# Patient Record
Sex: MALE | Race: OTHER | Employment: FULL TIME | URBAN - METROPOLITAN AREA
[De-identification: names, ages, dates, MRNs, and addresses within clinical notes are randomized per-mention and may not be internally consistent; named-entity substitution may affect disease eponyms.]

---

## 2017-09-28 ENCOUNTER — OFFICE VISIT (OUTPATIENT)
Dept: OTOLARYNGOLOGY | Facility: CLINIC | Age: 47
End: 2017-09-28

## 2017-09-28 VITALS
DIASTOLIC BLOOD PRESSURE: 100 MMHG | TEMPERATURE: 98 F | BODY MASS INDEX: 28 KG/M2 | HEIGHT: 63 IN | WEIGHT: 158 LBS | SYSTOLIC BLOOD PRESSURE: 160 MMHG

## 2017-09-28 DIAGNOSIS — H61.23 BILATERAL IMPACTED CERUMEN: Primary | ICD-10-CM

## 2017-09-28 PROCEDURE — 69210 REMOVE IMPACTED EAR WAX UNI: CPT | Performed by: OTOLARYNGOLOGY

## 2017-09-28 NOTE — PROGRESS NOTES
Annita Holter is a 49year old male.   Patient presents with:  Ear Wax: pt. denies any problems or pain, wants ears cleaned      HISTORY OF PRESENT ILLNESS    Patient presents for cerumen removal. No other complaints or concerns at this time    Social Normal Inspection - Right: Normal, Left: Normal. Canal - Right: Normal, Left: Normal. TM - Right: Normal, Left: Normal.   Skin Normal Inspection - Normal.                              Canals:  Right: Canal reveals cerumen impaction,   Left: Canal reveals c

## 2018-04-03 ENCOUNTER — OFFICE VISIT (OUTPATIENT)
Dept: FAMILY MEDICINE CLINIC | Facility: CLINIC | Age: 48
End: 2018-04-03

## 2018-04-03 VITALS
HEIGHT: 63 IN | HEART RATE: 72 BPM | BODY MASS INDEX: 30.12 KG/M2 | DIASTOLIC BLOOD PRESSURE: 114 MMHG | SYSTOLIC BLOOD PRESSURE: 220 MMHG | WEIGHT: 170 LBS

## 2018-04-03 DIAGNOSIS — I10 ESSENTIAL HYPERTENSION: Primary | ICD-10-CM

## 2018-04-03 PROCEDURE — 99212 OFFICE O/P EST SF 10 MIN: CPT | Performed by: FAMILY MEDICINE

## 2018-04-03 PROCEDURE — 99201 OFFICE/OUTPT VISIT,NEW,LEVL I: CPT | Performed by: FAMILY MEDICINE

## 2018-04-03 RX ORDER — BENAZEPRIL HYDROCHLORIDE 20 MG/1
20 TABLET ORAL DAILY
Qty: 30 TABLET | Refills: 1 | Status: SHIPPED | OUTPATIENT
Start: 2018-04-03 | End: 2018-04-24

## 2018-04-03 RX ORDER — HYDROCHLOROTHIAZIDE 12.5 MG/1
12.5 TABLET ORAL DAILY
Qty: 30 TABLET | Refills: 1 | Status: SHIPPED | OUTPATIENT
Start: 2018-04-03 | End: 2018-04-24

## 2018-04-03 RX ORDER — CARVEDILOL 3.12 MG/1
3.12 TABLET ORAL 2 TIMES DAILY WITH MEALS
Qty: 60 TABLET | Refills: 1 | Status: SHIPPED | OUTPATIENT
Start: 2018-04-03 | End: 2018-04-24

## 2018-04-03 NOTE — PROGRESS NOTES
htn  \"My blood pressure is high. I feel get great . \"    Patient's past medical surgical family social history was reviewed.     Review of Systems  Allergic: no environmental allergies or food allergies  Cardiovascular: no chest pain, irregular heartbeat, daily.  Dispense: 30 tablet; Refill: 1  - EKG 12-LEAD; Future  - XR CHEST PA + LAT CHEST (CPT=71046); Future  - CBC WITH DIFFERENTIAL WITH PLATELET; Future  - LIPID PANEL; Future  - COMP METABOLIC PANEL (14);  Future

## 2018-04-07 ENCOUNTER — HOSPITAL ENCOUNTER (OUTPATIENT)
Dept: GENERAL RADIOLOGY | Age: 48
Discharge: HOME | End: 2018-04-07
Attending: FAMILY MEDICINE

## 2018-04-07 ENCOUNTER — LAB ENCOUNTER (OUTPATIENT)
Dept: LAB | Age: 48
End: 2018-04-07
Attending: FAMILY MEDICINE

## 2018-04-07 DIAGNOSIS — I10 ESSENTIAL HYPERTENSION: ICD-10-CM

## 2018-04-07 PROCEDURE — 93010 ELECTROCARDIOGRAM REPORT: CPT | Performed by: FAMILY MEDICINE

## 2018-04-07 PROCEDURE — 71046 X-RAY EXAM CHEST 2 VIEWS: CPT | Performed by: FAMILY MEDICINE

## 2018-04-07 PROCEDURE — 93005 ELECTROCARDIOGRAM TRACING: CPT

## 2018-04-10 ENCOUNTER — TELEPHONE (OUTPATIENT)
Dept: OTHER | Age: 48
End: 2018-04-10

## 2018-04-10 DIAGNOSIS — R94.31 ABNORMAL EKG: Primary | ICD-10-CM

## 2018-04-11 NOTE — PROGRESS NOTES
Called patient (verified name and ), with  [facility]#[id number], advised Dr Dirk Day note and verbalized understanding. Number given [phone] to call to schedule the  Stress echo.       Notes recorded by Ignacio Nath DO on 2018

## 2018-04-11 NOTE — TELEPHONE ENCOUNTER
To Dr CMC=please review if this is the right exercise stress echo test that you want and sign the pended order,       Notes recorded by Gerardo Luciano DO on 4/9/2018 at 8:40 PM CDT  Chest x-ray showed no pneumonia.  EKG was abnormal.  Have the patient

## 2018-04-24 ENCOUNTER — OFFICE VISIT (OUTPATIENT)
Dept: FAMILY MEDICINE CLINIC | Facility: CLINIC | Age: 48
End: 2018-04-24

## 2018-04-24 VITALS
DIASTOLIC BLOOD PRESSURE: 103 MMHG | HEIGHT: 63 IN | BODY MASS INDEX: 29.06 KG/M2 | SYSTOLIC BLOOD PRESSURE: 213 MMHG | WEIGHT: 164 LBS | HEART RATE: 70 BPM

## 2018-04-24 DIAGNOSIS — R94.31 ABNORMAL EKG: ICD-10-CM

## 2018-04-24 DIAGNOSIS — I10 ESSENTIAL HYPERTENSION: Primary | ICD-10-CM

## 2018-04-24 PROCEDURE — 99213 OFFICE O/P EST LOW 20 MIN: CPT | Performed by: FAMILY MEDICINE

## 2018-04-24 PROCEDURE — 99212 OFFICE O/P EST SF 10 MIN: CPT | Performed by: FAMILY MEDICINE

## 2018-04-24 RX ORDER — CARVEDILOL 6.25 MG/1
6.25 TABLET ORAL 2 TIMES DAILY WITH MEALS
Qty: 60 TABLET | Refills: 11 | Status: SHIPPED | OUTPATIENT
Start: 2018-04-24 | End: 2020-04-25

## 2018-04-24 RX ORDER — HYDROCHLOROTHIAZIDE 25 MG/1
25 TABLET ORAL DAILY
Qty: 30 TABLET | Refills: 11 | Status: SHIPPED | OUTPATIENT
Start: 2018-04-24 | End: 2020-04-25

## 2018-04-24 RX ORDER — BENAZEPRIL HYDROCHLORIDE 40 MG/1
40 TABLET, FILM COATED ORAL 2 TIMES DAILY
Qty: 60 TABLET | Refills: 3 | Status: SHIPPED | OUTPATIENT
Start: 2018-04-24 | End: 2018-06-23

## 2018-04-24 NOTE — PROGRESS NOTES
Pt taking his medicaiton  Labs reviewed and were good  ekg abnormal  No asa  \"I feel fine. \"  No side effects from the medication.   Patient here with his wife who reports that he is a very strong snorer    Patient's past medical surgical family social his attack and that is why he has difficult to treat hypertension the only way to know is if I do additional testing. I offered referral to cardiologist for the stress test they prefer the stress test for at this time.     2. Abnormal EKG  As above orthopedics

## 2018-06-23 ENCOUNTER — OFFICE VISIT (OUTPATIENT)
Dept: FAMILY MEDICINE CLINIC | Facility: CLINIC | Age: 48
End: 2018-06-23

## 2018-06-23 VITALS
HEART RATE: 70 BPM | DIASTOLIC BLOOD PRESSURE: 77 MMHG | WEIGHT: 164 LBS | SYSTOLIC BLOOD PRESSURE: 135 MMHG | BODY MASS INDEX: 29.06 KG/M2 | HEIGHT: 63 IN

## 2018-06-23 DIAGNOSIS — I10 ESSENTIAL HYPERTENSION: Primary | ICD-10-CM

## 2018-06-23 PROCEDURE — 99212 OFFICE O/P EST SF 10 MIN: CPT | Performed by: FAMILY MEDICINE

## 2018-06-23 RX ORDER — BENAZEPRIL HYDROCHLORIDE 40 MG/1
40 TABLET, FILM COATED ORAL 2 TIMES DAILY
Qty: 180 TABLET | Refills: 3 | Status: SHIPPED | OUTPATIENT
Start: 2018-06-23 | End: 2020-04-25

## 2018-06-23 NOTE — PROGRESS NOTES
Takes medication in the night  bp 118/52 at home  Has been feeling a little tired after the medcation    No other side effects      Patient's past medical surgical family social history was reviewed.     Review of Systems  Allergic: no environmental allergi

## 2019-08-30 ENCOUNTER — HOSPITAL ENCOUNTER (EMERGENCY)
Facility: HOSPITAL | Age: 49
Discharge: HOME | End: 2019-08-30
Attending: EMERGENCY MEDICINE

## 2019-08-30 VITALS
OXYGEN SATURATION: 92 % | HEART RATE: 84 BPM | BODY MASS INDEX: 29 KG/M2 | TEMPERATURE: 99 F | WEIGHT: 165.38 LBS | SYSTOLIC BLOOD PRESSURE: 146 MMHG | RESPIRATION RATE: 18 BRPM | DIASTOLIC BLOOD PRESSURE: 100 MMHG

## 2019-08-30 DIAGNOSIS — K29.70 VIRAL GASTRITIS: Primary | ICD-10-CM

## 2019-08-30 LAB
ALBUMIN SERPL-MCNC: 3.6 G/DL (ref 3.4–5)
ALP LIVER SERPL-CCNC: 70 U/L (ref 45–117)
ALT SERPL-CCNC: 35 U/L (ref 16–61)
ANION GAP SERPL CALC-SCNC: 9 MMOL/L (ref 0–18)
AST SERPL-CCNC: 25 U/L (ref 15–37)
BASOPHILS # BLD AUTO: 0.03 X10(3) UL (ref 0–0.2)
BASOPHILS NFR BLD AUTO: 0.5 %
BILIRUB DIRECT SERPL-MCNC: 0.3 MG/DL (ref 0–0.2)
BILIRUB SERPL-MCNC: 1.3 MG/DL (ref 0.1–2)
BILIRUB UR QL: NEGATIVE
BUN BLD-MCNC: 29 MG/DL (ref 7–18)
BUN/CREAT SERPL: 24.2 (ref 10–20)
CALCIUM BLD-MCNC: 8.7 MG/DL (ref 8.5–10.1)
CHLORIDE SERPL-SCNC: 100 MMOL/L (ref 98–112)
CLARITY UR: CLEAR
CO2 SERPL-SCNC: 28 MMOL/L (ref 21–32)
COLOR UR: YELLOW
CREAT BLD-MCNC: 1.2 MG/DL (ref 0.7–1.3)
DEPRECATED RDW RBC AUTO: 39 FL (ref 35.1–46.3)
EOSINOPHIL # BLD AUTO: 0.04 X10(3) UL (ref 0–0.7)
EOSINOPHIL NFR BLD AUTO: 0.6 %
ERYTHROCYTE [DISTWIDTH] IN BLOOD BY AUTOMATED COUNT: 12.4 % (ref 11–15)
GLUCOSE BLD-MCNC: 113 MG/DL (ref 70–99)
GLUCOSE UR-MCNC: NEGATIVE MG/DL
HCT VFR BLD AUTO: 45.2 % (ref 39–53)
HGB BLD-MCNC: 16.1 G/DL (ref 13–17.5)
HYALINE CASTS #/AREA URNS AUTO: 5 /LPF
IMM GRANULOCYTES # BLD AUTO: 0.01 X10(3) UL (ref 0–1)
IMM GRANULOCYTES NFR BLD: 0.2 %
LEUKOCYTE ESTERASE UR QL STRIP.AUTO: NEGATIVE
LIPASE SERPL-CCNC: 149 U/L (ref 73–393)
LYMPHOCYTES # BLD AUTO: 0.99 X10(3) UL (ref 1–4)
LYMPHOCYTES NFR BLD AUTO: 15.9 %
M PROTEIN MFR SERPL ELPH: 7.6 G/DL (ref 6.4–8.2)
MCH RBC QN AUTO: 30.8 PG (ref 26–34)
MCHC RBC AUTO-ENTMCNC: 35.6 G/DL (ref 31–37)
MCV RBC AUTO: 86.4 FL (ref 80–100)
MONOCYTES # BLD AUTO: 0.92 X10(3) UL (ref 0.1–1)
MONOCYTES NFR BLD AUTO: 14.8 %
NEUTROPHILS # BLD AUTO: 4.22 X10 (3) UL (ref 1.5–7.7)
NEUTROPHILS # BLD AUTO: 4.22 X10(3) UL (ref 1.5–7.7)
NEUTROPHILS NFR BLD AUTO: 68 %
NITRITE UR QL STRIP.AUTO: NEGATIVE
OSMOLALITY SERPL CALC.SUM OF ELEC: 291 MOSM/KG (ref 275–295)
PH UR: 6 [PH] (ref 5–8)
PLATELET # BLD AUTO: 249 10(3)UL (ref 150–450)
POTASSIUM SERPL-SCNC: 3 MMOL/L (ref 3.5–5.1)
PROT UR-MCNC: 30 MG/DL
RBC # BLD AUTO: 5.23 X10(6)UL (ref 4.3–5.7)
RBC #/AREA URNS AUTO: 5 /HPF
SODIUM SERPL-SCNC: 137 MMOL/L (ref 136–145)
SP GR UR STRIP: 1.03 (ref 1–1.03)
UROBILINOGEN UR STRIP-ACNC: <2
VIT C UR-MCNC: NEGATIVE MG/DL
WBC # BLD AUTO: 6.2 X10(3) UL (ref 4–11)
WBC #/AREA URNS AUTO: 4 /HPF

## 2019-08-30 PROCEDURE — 96375 TX/PRO/DX INJ NEW DRUG ADDON: CPT

## 2019-08-30 PROCEDURE — S0028 INJECTION, FAMOTIDINE, 20 MG: HCPCS | Performed by: EMERGENCY MEDICINE

## 2019-08-30 PROCEDURE — 99284 EMERGENCY DEPT VISIT MOD MDM: CPT

## 2019-08-30 PROCEDURE — 96374 THER/PROPH/DIAG INJ IV PUSH: CPT

## 2019-08-30 PROCEDURE — 80048 BASIC METABOLIC PNL TOTAL CA: CPT

## 2019-08-30 PROCEDURE — 93010 ELECTROCARDIOGRAM REPORT: CPT | Performed by: EMERGENCY MEDICINE

## 2019-08-30 PROCEDURE — 96361 HYDRATE IV INFUSION ADD-ON: CPT

## 2019-08-30 PROCEDURE — 93005 ELECTROCARDIOGRAM TRACING: CPT

## 2019-08-30 PROCEDURE — 83690 ASSAY OF LIPASE: CPT | Performed by: EMERGENCY MEDICINE

## 2019-08-30 PROCEDURE — 80048 BASIC METABOLIC PNL TOTAL CA: CPT | Performed by: EMERGENCY MEDICINE

## 2019-08-30 PROCEDURE — 85025 COMPLETE CBC W/AUTO DIFF WBC: CPT | Performed by: EMERGENCY MEDICINE

## 2019-08-30 PROCEDURE — 80076 HEPATIC FUNCTION PANEL: CPT | Performed by: EMERGENCY MEDICINE

## 2019-08-30 PROCEDURE — 81001 URINALYSIS AUTO W/SCOPE: CPT | Performed by: EMERGENCY MEDICINE

## 2019-08-30 PROCEDURE — 85025 COMPLETE CBC W/AUTO DIFF WBC: CPT

## 2019-08-30 RX ORDER — FAMOTIDINE 20 MG/1
20 TABLET ORAL 2 TIMES DAILY PRN
Qty: 30 TABLET | Refills: 0 | Status: SHIPPED | OUTPATIENT
Start: 2019-08-30 | End: 2019-09-29

## 2019-08-30 RX ORDER — POTASSIUM CHLORIDE 20 MEQ/1
40 TABLET, EXTENDED RELEASE ORAL ONCE
Status: COMPLETED | OUTPATIENT
Start: 2019-08-30 | End: 2019-08-30

## 2019-08-30 RX ORDER — MORPHINE SULFATE 4 MG/ML
4 INJECTION, SOLUTION INTRAMUSCULAR; INTRAVENOUS ONCE
Status: COMPLETED | OUTPATIENT
Start: 2019-08-30 | End: 2019-08-30

## 2019-08-30 RX ORDER — FAMOTIDINE 10 MG/ML
20 INJECTION, SOLUTION INTRAVENOUS ONCE
Status: COMPLETED | OUTPATIENT
Start: 2019-08-30 | End: 2019-08-30

## 2019-08-31 NOTE — ED PROVIDER NOTES
Patient Seen in: [facility] AND [facility] Emergency Department    History   Patient presents with:  Abdomen/Flank Pain (GI/)    Stated Complaint: epigastric pain    HPI    28-year-old male with past medical history significant for high blood pressure presents Normal range of motion. Neck supple. No tracheal deviation present. CV: s1s2+, RRR, no m/r/g, normal distal pulses  Pulmonary/Chest: CTA b/l with no rales, wheezes.   No chest wall tenderness  Abdominal: Mild epigastric tenderness to palpation without willie GREEN   RAINBOW DRAW GOLD     EKG    Rate, intervals and axes as noted on EKG Report.   Rate: 86 bpm  Rhythm: Sinus Rhythm  Reading: Normal sinus rhythm, occasional PACs, T wave inversions in the inferolateral leads, not significantly changed from previous

## 2020-04-25 ENCOUNTER — TELEPHONE (OUTPATIENT)
Dept: FAMILY MEDICINE CLINIC | Facility: CLINIC | Age: 50
End: 2020-04-25

## 2020-04-25 ENCOUNTER — VIRTUAL PHONE E/M (OUTPATIENT)
Dept: FAMILY MEDICINE CLINIC | Facility: CLINIC | Age: 50
End: 2020-04-25

## 2020-04-25 VITALS — SYSTOLIC BLOOD PRESSURE: 140 MMHG | DIASTOLIC BLOOD PRESSURE: 91 MMHG

## 2020-04-25 DIAGNOSIS — I10 ESSENTIAL HYPERTENSION: Primary | ICD-10-CM

## 2020-04-25 PROCEDURE — 99441 PHONE E/M BY PHYS 5-10 MIN: CPT | Performed by: FAMILY MEDICINE

## 2020-04-25 RX ORDER — CARVEDILOL 6.25 MG/1
6.25 TABLET ORAL 2 TIMES DAILY WITH MEALS
Qty: 180 TABLET | Refills: 0 | Status: SHIPPED | OUTPATIENT
Start: 2020-04-25 | End: 2021-10-09

## 2020-04-25 RX ORDER — BENAZEPRIL HYDROCHLORIDE 40 MG/1
40 TABLET, FILM COATED ORAL DAILY
Qty: 90 TABLET | Refills: 0 | Status: SHIPPED | OUTPATIENT
Start: 2020-04-25 | End: 2020-07-22

## 2020-04-25 RX ORDER — HYDROCHLOROTHIAZIDE 25 MG/1
25 TABLET ORAL DAILY
Qty: 90 TABLET | Refills: 0 | Status: SHIPPED | OUTPATIENT
Start: 2020-04-25 | End: 2020-07-22

## 2020-04-25 NOTE — PROGRESS NOTES
Virtual Telephone Check-In    [address] [place] [address] Street verbally consents to a Virtual/Telephone Check-In visit on 04/25/20.     Patient understands and accepts financial responsibility for any deductible, co-insurance and/or co-pays associated with this serv

## 2020-04-25 NOTE — TELEPHONE ENCOUNTER
Lab orders mailed to patient per [facility]. Patient reminded to make and appointment with Dr. Crow Hui in June 2020.

## 2020-07-22 ENCOUNTER — OFFICE VISIT (OUTPATIENT)
Dept: FAMILY MEDICINE CLINIC | Facility: CLINIC | Age: 50
End: 2020-07-22

## 2020-07-22 VITALS
HEART RATE: 82 BPM | HEIGHT: 63 IN | SYSTOLIC BLOOD PRESSURE: 140 MMHG | DIASTOLIC BLOOD PRESSURE: 90 MMHG | BODY MASS INDEX: 29.48 KG/M2 | WEIGHT: 166.38 LBS

## 2020-07-22 DIAGNOSIS — I10 ESSENTIAL HYPERTENSION: Primary | ICD-10-CM

## 2020-07-22 DIAGNOSIS — Z12.11 ENCOUNTER FOR SCREENING COLONOSCOPY: ICD-10-CM

## 2020-07-22 PROCEDURE — 99213 OFFICE O/P EST LOW 20 MIN: CPT | Performed by: FAMILY MEDICINE

## 2020-07-22 PROCEDURE — 3080F DIAST BP >= 90 MM HG: CPT | Performed by: FAMILY MEDICINE

## 2020-07-22 PROCEDURE — 3008F BODY MASS INDEX DOCD: CPT | Performed by: FAMILY MEDICINE

## 2020-07-22 PROCEDURE — 3077F SYST BP >= 140 MM HG: CPT | Performed by: FAMILY MEDICINE

## 2020-07-22 RX ORDER — HYDROCHLOROTHIAZIDE 25 MG/1
25 TABLET ORAL DAILY
Qty: 90 TABLET | Refills: 2 | Status: SHIPPED | OUTPATIENT
Start: 2020-07-22 | End: 2021-10-09

## 2020-07-22 RX ORDER — CARVEDILOL 12.5 MG/1
12.5 TABLET ORAL 2 TIMES DAILY WITH MEALS
Qty: 180 TABLET | Refills: 2 | Status: SHIPPED | OUTPATIENT
Start: 2020-07-22 | End: 2021-01-18

## 2020-07-22 RX ORDER — BENAZEPRIL HYDROCHLORIDE 40 MG/1
40 TABLET, FILM COATED ORAL DAILY
Qty: 90 TABLET | Refills: 2 | Status: SHIPPED | OUTPATIENT
Start: 2020-07-22 | End: 2021-10-09

## 2020-07-22 NOTE — PROGRESS NOTES
Román Morris is a 45year old male.   HPI:   Patient is here to establish care, he has history of high blood pressure that was diagnosed 2 years ago, he has been taking carvedilol 6.25 twice a day with benazepril 40 mg and hydrochlorothiazide 20 Appearance: Normal appearance. HENT:      Head: Normocephalic and atraumatic. Nose: Nose normal.      Mouth/Throat:      Mouth: Mucous membranes are moist.   Eyes:      Extraocular Movements: Extraocular movements intact.       Conjunctiva/sclera: Co by mouth daily. Dispense: 90 tablet; Refill: 2  - hydrochlorothiazide 25 MG Oral Tab; Take 1 tablet (25 mg total) by mouth daily. Dispense: 90 tablet; Refill: 2  - carvedilol 12.5 MG Oral Tab;  Take 1 tablet (12.5 mg total) by mouth 2 (two) times daily wi

## 2021-10-09 ENCOUNTER — OFFICE VISIT (OUTPATIENT)
Dept: FAMILY MEDICINE CLINIC | Facility: CLINIC | Age: 51
End: 2021-10-09

## 2021-10-09 VITALS
HEART RATE: 63 BPM | BODY MASS INDEX: 29.75 KG/M2 | HEIGHT: 63 IN | WEIGHT: 167.88 LBS | DIASTOLIC BLOOD PRESSURE: 90 MMHG | SYSTOLIC BLOOD PRESSURE: 140 MMHG

## 2021-10-09 DIAGNOSIS — I10 ESSENTIAL HYPERTENSION: Primary | ICD-10-CM

## 2021-10-09 DIAGNOSIS — Z12.11 COLON CANCER SCREENING: ICD-10-CM

## 2021-10-09 PROCEDURE — 3008F BODY MASS INDEX DOCD: CPT | Performed by: FAMILY MEDICINE

## 2021-10-09 PROCEDURE — 3080F DIAST BP >= 90 MM HG: CPT | Performed by: FAMILY MEDICINE

## 2021-10-09 PROCEDURE — 3077F SYST BP >= 140 MM HG: CPT | Performed by: FAMILY MEDICINE

## 2021-10-09 PROCEDURE — 99214 OFFICE O/P EST MOD 30 MIN: CPT | Performed by: FAMILY MEDICINE

## 2021-10-09 RX ORDER — CARVEDILOL 6.25 MG/1
6.25 TABLET ORAL 2 TIMES DAILY WITH MEALS
Qty: 180 TABLET | Refills: 0 | Status: SHIPPED | OUTPATIENT
Start: 2021-10-09 | End: 2022-01-06

## 2021-10-09 RX ORDER — BENAZEPRIL HYDROCHLORIDE 40 MG/1
40 TABLET, FILM COATED ORAL DAILY
Qty: 90 TABLET | Refills: 2 | Status: SHIPPED | OUTPATIENT
Start: 2021-10-09

## 2021-10-09 RX ORDER — HYDROCHLOROTHIAZIDE 25 MG/1
25 TABLET ORAL DAILY
Qty: 90 TABLET | Refills: 2 | Status: SHIPPED | OUTPATIENT
Start: 2021-10-09

## 2021-10-09 NOTE — PROGRESS NOTES
Dennie Simmers is a 45year old male.   HPI:   Patient is here for medication refill, he reports that he has been without medication for about 3 days, he states that since last visit he has been doing overall well, he has not had any complaints, no distress. Appearance: Normal appearance. He is not ill-appearing. HENT:      Head: Normocephalic and atraumatic. Neck:      Vascular: No carotid bruit. Cardiovascular:      Rate and Rhythm: Normal rate and regular rhythm.    Pulmonary:      Effort PANEL (14); Future  - LIPID PANEL; Future    2. Colon cancer screening  Patient has never completed colonoscopy, he has no insurance at this time but does agree to complete FIT testing  - OCCULT BLOOD, FECAL, IMMUNOASSAY;  Future     Patient declined flu sh

## 2022-01-05 DIAGNOSIS — I10 ESSENTIAL HYPERTENSION: ICD-10-CM

## 2022-01-06 RX ORDER — CARVEDILOL 6.25 MG/1
TABLET ORAL
Qty: 180 TABLET | Refills: 0 | Status: SHIPPED | OUTPATIENT
Start: 2022-01-06

## 2022-01-06 NOTE — TELEPHONE ENCOUNTER
Used language line, Kameron Beatty ID [id number], patient said he will make an appointment when he runs out of medication in a year.

## 2022-04-05 RX ORDER — CARVEDILOL 6.25 MG/1
TABLET ORAL
Qty: 180 TABLET | Refills: 0 | Status: SHIPPED | OUTPATIENT
Start: 2022-04-05

## 2022-04-07 NOTE — TELEPHONE ENCOUNTER
Patient states Estella Simpson does not have his Carvedilol that was sent on 4/5. [address] states medication is ready for . Advised patient medication is ready. States understanding.

## 2022-06-28 DIAGNOSIS — I10 ESSENTIAL HYPERTENSION: ICD-10-CM

## 2022-06-29 RX ORDER — HYDROCHLOROTHIAZIDE 25 MG/1
25 TABLET ORAL DAILY
Qty: 30 TABLET | Refills: 0 | Status: SHIPPED | OUTPATIENT
Start: 2022-06-29

## 2022-06-29 RX ORDER — BENAZEPRIL HYDROCHLORIDE 40 MG/1
40 TABLET, FILM COATED ORAL DAILY
Qty: 30 TABLET | Refills: 0 | Status: SHIPPED | OUTPATIENT
Start: 2022-06-29

## 2022-06-29 RX ORDER — CARVEDILOL 6.25 MG/1
6.25 TABLET ORAL 2 TIMES DAILY WITH MEALS
Qty: 60 TABLET | Refills: 0 | Status: SHIPPED | OUTPATIENT
Start: 2022-06-29 | End: 2022-07-29

## 2022-09-26 DIAGNOSIS — I10 ESSENTIAL HYPERTENSION: ICD-10-CM

## 2022-09-27 NOTE — TELEPHONE ENCOUNTER
JOSE please assist with appt  Please review. Protocol failed/ No protocol      Requested Prescriptions   Pending Prescriptions Disp Refills    BENAZEPRIL HCL 40 MG Oral Tab [Pharmacy Med Name: Benazepril HCl 40 MG Oral Tablet] 90 tablet 0     Sig: Take 1 tablet by mouth once daily        Hypertensive Medications Protocol Failed - 9/26/2022  9:10 PM        Failed - Last BP reading less than 140/90     BP Readings from Last 1 Encounters:  10/09/21 : 140/90                Failed - CMP or BMP in past 6 months     No results found for this or any previous visit (from the past 4392 hour(s)).               Failed - In person appointment or virtual visit in the past 6 months       Recent Outpatient Visits              11 months ago Essential hypertension    [address], Marly Fernandes MD    Office Visit    2 years ago Essential hypertension    [facility] Oly Maharaj MD    Office Visit    2 years ago Essential hypertension    [facility] [address], [address] [place] [address], DO    Virtual Phone E/M    4 years ago Essential hypertension    [facility], [facility] [address], [address] [facility], [facility],     Office Visit    4 years ago Essential hypertension    [facility], [address] [place] [address]    Office Visit                 Failed - EGFRCR or GFRNAA > 50     GFR Evaluation              Passed - In person appointment in the past 12 or next 3 months       Recent Outpatient Visits              11 months ago Essential hypertension    [address] [facility] Nikolay Maharaj MD    Office Visit    2 years ago Essential hypertension    [facility]    Office Visit    2 years ago Essential hypertension    [place] [facility], [facility] [address], [address] [facility] Luisana, DO    Virtual Phone E/M    4 years ago Essential hypertension    [facility], [address], [address] [place] [address], Irenefabricio Adan DO    Office Visit    4 years ago Essential hypertension    [address], [address] [place] [address], [place], [place]    Office Visit                    HYDROCHLOROTHIAZIDE 25 MG Oral Tab [Pharmacy Med Name: hydroCHLOROthiazide 25 MG Oral Tablet] 90 tablet 0     Sig: Take 1 tablet by mouth once daily        Hypertensive Medications Protocol Failed - 9/26/2022  9:10 PM        Failed - Last BP reading less than 140/90     BP Readings from Last 1 Encounters:  10/09/21 : 140/90                Failed - CMP or BMP in past 6 months     No results found for this or any previous visit (from the past 4392 hour(s)).               Failed - In person appointment or virtual visit in the past 6 months       Recent Outpatient Visits              11 months ago Essential hypertension    [address], Mode Hand MD    Office Visit    2 years ago Essential hypertension    [address] [facility] Marquise Ambrose MD    Office Visit    2 years ago Essential hypertension    [address], [address] [place] [address], Irenefabricio Adan DO    Virtual Phone E/M    4 years ago Essential hypertension    [address], [address] [place] [address], Irenefabricio Adan DO    Office Visit    4 years ago Essential hypertension    [address], [address] [place] [address], [place], [place]    Office Visit                 Failed - EGFRCR or GFRNAA > 50     GFR Evaluation              Passed - In person appointment in the past 12 or next 3 months       Recent Outpatient Visits              11 months ago Essential hypertension    [address] [facility] Marquise Ambrose MD    Office Visit    2 years ago Essential hypertension    [facility] Marquise Ambrose MD    Office Visit    2 years ago Essential hypertension    [address], [address], [address] [place] [address], Irenefabricio Adan DO    Virtual Phone E/M    4 years ago Essential hypertension    [address]    Office Visit    4 years ago Essential hypertension    [address]    Office Visit                    CARVEDILOL 6.25 MG Oral Tab [Pharmacy Med Name: Carvedilol 6.25 MG Oral Tablet] 180 tablet 0     Sig: TAKE 1 TABLET BY MOUTH TWICE DAILY WITH MEALS        Hypertensive Medications Protocol Failed - 9/26/2022  9:10 PM        Failed - Last BP reading less than 140/90     BP Readings from Last 1 Encounters:  10/09/21 : 140/90                Failed - CMP or BMP in past 6 months     No results found for this or any previous visit (from the past 4392 hour(s)).               Failed - In person appointment or virtual visit in the past 6 months       Recent Outpatient Visits              11 months ago Essential hypertension    [address], Toma Montgomery MD    Office Visit    2 years ago Essential hypertension    [address] [facility] Adeel Spivey MD    Office Visit    2 years ago Essential hypertension    [address]    Virtual Phone E/M    4 years ago Essential hypertension    [address]    Office Visit    4 years ago Essential hypertension    [address]    Office Visit                 Failed - EGFRCR or GFRNAA > 50     GFR Evaluation              Passed - In person appointment in the past 12 or next 3 months       Recent Outpatient Visits              11 months ago Essential hypertension    [address] [facility] Adeel Spivey MD    Office Visit    2 years ago Essential hypertension    [address] [facility] Adeel pSivey MD    Office Visit    2 years ago Essential hypertension    [address] [place] [facility], DO    Virtual Phone E/M    4 years ago Essential hypertension    [facility], [facility], [address], [address] [facility], DO    Office Visit    4 years ago Essential hypertension    [email or web address], [address], [address] [facility], [place]    Office Visit                            Recent Outpatient Visits              11 months ago Essential hypertension    [address] Vicky Gonzalez MD    Office Visit    2 years ago Essential hypertension    [address] Vicky Gonzalez MD    Office Visit    2 years ago Essential hypertension    [place] [email or web address], [address], [address] [facility], DO    Virtual Phone E/M    4 years ago Essential hypertension    [place] [facility] [place] [email or web address], [address], [address] [facility], DO    Office Visit    4 years ago Essential hypertension    [email or web address], [address], [address] [facility], [place]    Office Visit

## 2022-09-28 RX ORDER — CARVEDILOL 6.25 MG/1
6.25 TABLET ORAL 2 TIMES DAILY WITH MEALS
Qty: 60 TABLET | Refills: 0 | Status: SHIPPED | OUTPATIENT
Start: 2022-09-28 | End: 2022-10-28

## 2022-09-28 RX ORDER — BENAZEPRIL HYDROCHLORIDE 40 MG/1
40 TABLET, FILM COATED ORAL DAILY
Qty: 30 TABLET | Refills: 0 | Status: SHIPPED | OUTPATIENT
Start: 2022-09-28

## 2022-09-28 RX ORDER — HYDROCHLOROTHIAZIDE 25 MG/1
25 TABLET ORAL DAILY
Qty: 30 TABLET | Refills: 0 | Status: SHIPPED | OUTPATIENT
Start: 2022-09-28

## 2022-10-08 ENCOUNTER — OFFICE VISIT (OUTPATIENT)
Dept: FAMILY MEDICINE CLINIC | Facility: CLINIC | Age: 52
End: 2022-10-08

## 2022-10-08 VITALS
WEIGHT: 165 LBS | HEIGHT: 63 IN | HEART RATE: 66 BPM | DIASTOLIC BLOOD PRESSURE: 90 MMHG | RESPIRATION RATE: 17 BRPM | BODY MASS INDEX: 29.23 KG/M2 | SYSTOLIC BLOOD PRESSURE: 145 MMHG

## 2022-10-08 DIAGNOSIS — Z12.5 SCREENING FOR PROSTATE CANCER: ICD-10-CM

## 2022-10-08 DIAGNOSIS — I10 ESSENTIAL HYPERTENSION: ICD-10-CM

## 2022-10-08 DIAGNOSIS — Z12.11 COLON CANCER SCREENING: Primary | ICD-10-CM

## 2022-10-08 PROCEDURE — 99214 OFFICE O/P EST MOD 30 MIN: CPT | Performed by: FAMILY MEDICINE

## 2022-10-08 RX ORDER — HYDROCHLOROTHIAZIDE 50 MG/1
50 TABLET ORAL DAILY
Qty: 90 TABLET | Refills: 0 | Status: SHIPPED | OUTPATIENT
Start: 2022-10-08 | End: 2023-01-06

## 2022-10-08 RX ORDER — HYDROCHLOROTHIAZIDE 25 MG/1
25 TABLET ORAL DAILY
Qty: 30 TABLET | Refills: 0 | Status: CANCELLED | OUTPATIENT
Start: 2022-10-08

## 2022-10-08 RX ORDER — BENAZEPRIL HYDROCHLORIDE 40 MG/1
40 TABLET, FILM COATED ORAL DAILY
Qty: 90 TABLET | Refills: 0 | Status: SHIPPED | OUTPATIENT
Start: 2022-10-08 | End: 2023-01-06

## 2022-10-08 RX ORDER — CARVEDILOL 6.25 MG/1
6.25 TABLET ORAL 2 TIMES DAILY WITH MEALS
Qty: 180 TABLET | Refills: 0 | Status: SHIPPED | OUTPATIENT
Start: 2022-10-08 | End: 2023-01-06

## 2022-12-25 DIAGNOSIS — I10 ESSENTIAL HYPERTENSION: ICD-10-CM

## 2022-12-25 NOTE — TELEPHONE ENCOUNTER
Please review. Protocol failed / No protocol. Requested Prescriptions   Pending Prescriptions Disp Refills    BENAZEPRIL HCL 40 MG Oral Tab [Pharmacy Med Name: Benazepril HCl 40 MG Oral Tablet] 90 tablet 0     Sig: Take 1 tablet by mouth once daily       Hypertensive Medications Protocol Failed - 12/25/2022  1:27 AM        Failed - Last BP reading less than 140/90     BP Readings from Last 1 Encounters:  10/08/22 : 145/90              Failed - CMP or BMP in past 6 months     No results found for this or any previous visit (from the past 4392 hour(s)).             Failed - EGFRCR or GFRNAA > 50     GFR Evaluation            Passed - In person appointment in the past 12 or next 3 months     Recent Outpatient Visits              2 months ago Colon cancer screening    [address], Semaj Brady MD    Office Visit    1 year ago Essential hypertension    [address] [facility] Judie Angel MD    Office Visit    2 years ago Essential hypertension    [address] [facility] Judie Angel MD    Office Visit    2 years ago Essential hypertension    [address], [facility]    Virtual Phone E/M    4 years ago Essential hypertension    [address], [address] - In person appointment or virtual visit in the past 6 months     Recent Outpatient Visits              2 months ago Colon cancer screening    [address] [facility] Judie Angel MD    Office Visit    1 year ago Essential hypertension    [address] [facility] Judie Angel MD    Office Visit    2 years ago Essential hypertension    [address] [facility] Judie Angel MD    Office Visit    2 years ago Essential hypertension    [address], [address] [facility] [place] DO Chasity    Virtual Phone E/M    4 years ago Essential hypertension    [place] [facility], [facility], [address] [facility] Nilesh Gaspar, [place]    Office Visit                        CARVEDILOL 6.25 MG Oral Tab [Pharmacy Med Name: Carvedilol 6.25 MG Oral Tablet] 180 tablet 0     Sig: TAKE 1 TABLET BY MOUTH TWICE DAILY WITH MEALS       Hypertensive Medications Protocol Failed - 12/25/2022  1:27 AM        Failed - Last BP reading less than 140/90     BP Readings from Last 1 Encounters:  10/08/22 : 145/90              Failed - CMP or BMP in past 6 months     No results found for this or any previous visit (from the past 4392 hour(s)).             Failed - EGFRCR or GFRNAA > 50     GFR Evaluation            Passed - In person appointment in the past 12 or next 3 months     Recent Outpatient Visits              2 months ago [facility] cancer screening    [facility], Nidia Merino [facility]    Office Visit    1 year ago Essential hypertension    [address] [facility] Alisha Caldwell MD    Office Visit    2 years ago Essential hypertension    [address] [facility] Alisha Caldwell MD    Office Visit    2 years ago Essential hypertension    [facility], [facility], [address] [facility] Nilesh GasparDO    Virtual Phone E/M    4 years ago Essential hypertension    [place] [facility], [address], [address] [place] [address] [address] - In person appointment or virtual visit in the past 6 months     Recent Outpatient Visits              2 months ago Colon cancer screening    [address] [facility] Alisha Caldwell MD    Office Visit    1 year ago Essential hypertension    [address] [facility] Alisha Caldwell MD    Office Visit    2 years ago Essential hypertension    [address] [facility] Alisha Caldwell MD    Office Visit    2 years ago Essential hypertension    [facility] [address], [address] [facility],     Virtual Phone E/M    4 years ago Essential hypertension    [facility], [address] [place], [place]    Office Visit                             Recent Outpatient Visits              2 months ago Colon cancer screening    [address] [facility] Nathalie Richey MD    Office Visit    1 year ago Essential hypertension    [address] [facility] Nathalie Richey MD    Office Visit    2 years ago Essential hypertension    [address] [facility] Nathalie Richey MD    Office Visit    2 years ago Essential hypertension    [facility], [facility], [address] [facility],     Virtual Phone E/M    4 years ago Essential hypertension    [facility], [address] [place], [place]    Office Visit

## 2022-12-27 RX ORDER — BENAZEPRIL HYDROCHLORIDE 40 MG/1
TABLET, FILM COATED ORAL
Qty: 90 TABLET | Refills: 0 | Status: SHIPPED | OUTPATIENT
Start: 2022-12-27

## 2022-12-27 RX ORDER — CARVEDILOL 6.25 MG/1
TABLET ORAL
Qty: 180 TABLET | Refills: 0 | Status: SHIPPED | OUTPATIENT
Start: 2022-12-27

## 2023-01-02 DIAGNOSIS — I10 ESSENTIAL HYPERTENSION: ICD-10-CM

## 2023-01-03 RX ORDER — HYDROCHLOROTHIAZIDE 50 MG/1
TABLET ORAL
Qty: 90 TABLET | Refills: 0 | Status: SHIPPED | OUTPATIENT
Start: 2023-01-03

## 2023-01-05 NOTE — TELEPHONE ENCOUNTER
[facility] id# [id number]; patient is aware he needs an appt, he will call back to schedule in 1 month and half. He is self pay and does not want to come more often than needed.

## 2023-03-25 DIAGNOSIS — I10 ESSENTIAL HYPERTENSION: ICD-10-CM

## 2023-03-25 RX ORDER — BENAZEPRIL HYDROCHLORIDE 40 MG/1
TABLET, FILM COATED ORAL
Qty: 90 TABLET | Refills: 0 | OUTPATIENT
Start: 2023-03-25

## 2023-03-25 RX ORDER — CARVEDILOL 6.25 MG/1
TABLET ORAL
Qty: 180 TABLET | Refills: 0 | OUTPATIENT
Start: 2023-03-25

## 2023-03-25 NOTE — TELEPHONE ENCOUNTER
See also refill encounter  1/2/23,already informed about the appointment. Last office visit 10/8/22; The patient is asked to return in 1m. No future appointments. Encounter closed.

## (undated) DIAGNOSIS — I10 ESSENTIAL HYPERTENSION: ICD-10-CM

## (undated) NOTE — LETTER
11/21/20      [facility]  [address] [facility]      Dear Kelly Dahl,    [address] [facility] records indicate that you have outstanding lab work and or testing that was ordered for you and has not yet been completed:  Orders Placed This [place]

## (undated) NOTE — LETTER
07/01/20    [facility]  [address] [facility]      Dear Vini Mccall,    [facility] records indicate that you have outstanding lab work and or testing that was ordered for you and has not yet been completed:  Orders Placed This Encounte

## (undated) NOTE — LETTER
05/26/20        [facility]  [address] [facility]      Dear Fredis Lopez,    [address] [facility] records indicate that you have outstanding lab work and or testing that was ordered for you and has not yet been completed:  Orders Placed This En

## (undated) NOTE — ED AVS SNAPSHOT
[address] [place]   MRN: [id number]    Department:  [facility]   Date of Visit:  8/30/2019           Disclosure     Insurance plans vary and the physician(s) referred by the ER may not be covered by your plan.  Please CARE PHYSICIAN AT ONCE OR RETURN IMMEDIATELY TO THE EMERGENCY DEPARTMENT. If you have been prescribed any medication(s), please fill your prescription right away and begin taking the medication(s) as directed.   If you believe that any of the medications

## (undated) NOTE — LETTER
04/21/21    [facility]  [address] [facility]      Dear Sotero Yadav,    [address] [facility] records indicate that you have outstanding lab work and or testing that was ordered for you and has not yet been completed:  Orders Placed This Encounte

## (undated) NOTE — LETTER
01/21/21    [facility]  [address] [facility]      Dear Raul Galarza,    [address] [facility] records indicate that you have outstanding lab work and or testing that was ordered for you and has not yet been completed:  Orders Placed This Encounte

## (undated) NOTE — LETTER
11/04/20        [address] [place] [address]      Dear Eugenio Badillo,    [address] [facility] records indicate that you have outstanding lab work and or testing that was ordered for you and has not yet been completed:  Orders Placed This En

## (undated) NOTE — LETTER
01/22/19        [facility]  [address] [facility]      Dear Jemma Bullock,    [address] [facility] records indicate that you have outstanding lab work and or testing that was ordered for you and has not yet been completed:  Orders Placed This En

## (undated) NOTE — LETTER
11/08/21        [facility]  [address] [facility]      Dear Sonny Stage,    [address] [facility] records indicate that you have outstanding lab work and or testing that was ordered for you and has not yet been completed:  Orders Placed This En